# Patient Record
(demographics unavailable — no encounter records)

---

## 2025-06-27 NOTE — HISTORY OF PRESENT ILLNESS
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 Stay was complicated by hypoxic RF s/p intubation on 03/27/25 and extubation on 03/31/25. Stay was also complicated by drop in Hb on 03/31/25 with brown output from ileostomy on 03/31/25. Status post PEJ tube placement on 04/09/25. He was brought again on 05/06 for bloody ileostomy output with stay complicated by acute drop in Hb requiring transfusion. S/P EGD on 05/09/25 revealing high risk ulcers in duodenum s/p hemostasis as below. Presents today as a HFU.  Patient is doing well since he was discharged. As per family, pt was congested, so they brought him back to the ED at [facility] For possible melena, however pt was found to be stable and had dark green stools.  Pt eats minced and moist diet, taking pills by mouth, tolerating all well.  he is using PEJ tube intermittently.   Otherwise, patient denies any other GI complaints and has no abd pain, no nausea or vomiting, no dysphagia or odynophagia, no heartburn, no unintentional weight loss or appetite changes, no diarrhea, no constipation or changes in BMs including hematochezia or melena, no coffee ground emesis or hematemesis.   ROS otherwise negative. -----------------------------------------------  UGIB 2/2 Duodenal Bulb ulcer s/p recent Gold probe on 05/09 and again on 05/15; OVESCO on 05/22 Hx of IBD s/p subtotal colectomy and RLQ ileostomy Hx of gastroparesis  * CT Angio Abdomen and Pelvis w/ IV Cont (05.06.25 @ 03:39) No CTA evidence of active gastrostomy bleeding. Post subtotal colectomy with right lower quadrant ostomy.  No evidence of active inflammatory bowel disease. * Previous CT Abdomen and Pelvis w/ IV Cont (03.22.25 @ 21:21) >Post subtotal colectomy with right lower quadrant ostomy. Rectosigmoid submucosal fat deposition, likely sequela ofchronicinflammatory bowel disease. Stomach distended with debris, with abrupt caliber change at antrum/pylorus; findings could represent obstruction in the appropriate clinical setting (series 5, image 208). No small bowel obstruction; visualized bowel collapsed. Lower ventral hernia containing nonobstructed bowel. * s/p unremarkable EGD with PEG J on 04/10  * s/p EGD on 05/09: A single cratered 15 mm ulcer was found in theduodenal bulb. A visible vessel suggested recent bleeding. Puma Class 2A.4 1ml epinephrine 1/99172 injections were successfully applied for hemostasis. Gold probe was successfully applied for hemostasis. A cratered 10 mm ulcer was found in the duodenal sweep. A pigmental materialsuggested recent bleeding. Puma Class 2C.One endoclip was successfullyapplied for the purpose of hemostasis. 2 1 ml epinephrine 1/08322 injectionswere successfully appliedfor hemostasis. * s/p EGD (05.15.25 @ 10:30) A 3cm Puma Class 2A non-bleeding cratered ulcer with a visible vessel suggesting recent bleeding was seen in the duodenal bulb. A visible vessel suggested recent bleeding. Puma Class 2A. 6 1 ml epinephrine 1/21859 injections were successfully applied for hemostasis. Gold probe was successfully applied for hemostasis. . The previously placed endoclip was seen in the second part of the duodenum. No oozing of blood was seen. A small 7mm non bleeding clean-based ulcer was seen next to the previously placed endoclip. Two endoclips were successfully placed to ensure hemostasis and prevent bleeding. . * s/p EGD on 05/22: A 2 cm Puma Class 2A cratered non-bleeding ulcer with a pulsating visible vessel was seen in the duodenal bulb. 5 mLs of 1:82929 epinephrine 1/51268 weresuccessfully injected at the ulcer margins. Using a coagrasper under setting, coagulation was applied to the vessel. Active spurting resulted limiting visibility. Using aggressive irrigation and suctioning, the bleeding site was better delineated. Using a coagrasper under soft coagulationsetting, coagulation was applied and the bleeding slowed further. A 12/6 overthe scope clip (OVESCO) was successfully applied but bleeding was seen around the edge of the OVESCO. 3 mLs of 1:10,000 epinephrine was successfully injected and the bleeding slowed. Coagulation was further applied using a coagrasperunder soft coagulation setting and bleeding slowed further. A mantis clip wasdeployed and hemostatic powder (Nexpowder) applied along the site. Hemostasis was achieved and no further bleeding was seen at the end of the procedure. * s/p Colonoscopy 03/24: mild anal stenosis; mild colitis with chronic inactive proctitis on pathology  ----------------------------------------------- CT Angio Abdomen and Pelvis 5/6/25 Post liver transplantation. Gallbladder surgically resected.  1.  No CTA evidence of active gastrostomy normal bleed. 2.  Post subtotal colectomy with right lower quadrant ostomy.  No evidence of active inflammatory bowel disease.   -----------------------------------------------  Endoscopic Evaluations: Pt following with [facility] For sigmoidoscopies.   [de-identified] : 05/22/25

## 2025-06-27 NOTE — PHYSICAL EXAM
[Alert] : alert [Normal Voice/Communication] : normal voice/communication [Healthy Appearing] : healthy appearing [No Acute Distress] : no acute distress [Sclera] : the sclera and conjunctiva were normal [Hearing Threshold Finger Rub Not [place]] : hearing was normal [Normal Lips/Gums] : the lips and gums were normal [Oropharynx] : the oropharynx was normal [Normal Appearance] : the appearance of the neck was normal [No Neck Mass] : no neck mass was observed [No Respiratory Distress] : no respiratory distress [No Acc Muscle Use] : no accessory muscle use [Respiration, Rhythm And Depth] : normal respiratory rhythm and effort [Auscultation Breath Sounds / Voice Sounds] : lungs were clear to auscultation bilaterally [Heart Rate And Rhythm] : heart rate was normal and rhythm regular [Normal S1, S2] : normal S1 and S2 [Murmurs] : no murmurs [Bowel Sounds] : normal bowel sounds [Abdomen Tenderness] : non-tender [No Masses] : no abdominal mass palpated [Abdomen Soft] : soft [] : no hepatosplenomegaly [Oriented To Time, Place, And Person] : oriented to person, place, and time [de-identified] : J Tube; ileostomy noted

## 2025-06-27 NOTE — ASSESSMENT
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 Stay was complicated by hypoxic RF s/p intubation on 03/27/25 and extubation on 03/31/25. Stay was also complicated by drop in Hb on 03/31/25 with brown output from ileostomy on 03/31/25. Status post PEJ tube placement on 04/09/25. He was brought again on 05/06 for bloody ileostomy output with stay complicated by acute drop in Hb requiring transfusion. S/P EGD on 05/09/25 revealing high risk ulcers in duodenum s/p hemostasis as below. Presents today as a HFU.  #Ulcerative colitis; gastrocolnic fistula; s/p subtotal colectomy.  #GI Bleed - bleeding duodenal ulcers s/p OVESCO + thermal therapy + hemostatic spray #malnutrition s/p PEJ Tube #hx of LT on  -Continue PPI -Patient is stable, clinically doing well  -No melena noted -Discussed removal of PEJ tube when pts weight is stable and caloric intake is stable. pt will call us when cleared to have it removed -No need for any endoscopic intervention -NSAID avoidance  -Continue following up with hepatology and Gastroenterology at [facility] for management of liver transplant and surveillance flex sig for hx of UC  Follow up here PRN

## 2025-06-27 NOTE — PHYSICAL EXAM

## 2025-06-27 NOTE — ASSESSMENT
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 Stay was complicated by hypoxic RF s/p intubation on 03/27/25 and extubation on 03/31/25. Stay was also complicated by drop in Hb on 03/31/25 with brown output from ileostomy on 03/31/25. Status post PEJ tube placement on 04/09/25. He was brought again on 05/06 for bloody ileostomy output with stay complicated by acute drop in Hb requiring transfusion. S/P EGD on 05/09/25 revealing high risk ulcers in duodenum s/p hemostasis as below. Presents today as a HFU.  #Ulcerative colitis; gastrocolnic fistula; s/p subtotal colectomy.  #GI Bleed - bleeding duodenal ulcers s/p OVESCO + thermal therapy + hemostatic spray #malnutrition s/p PEJ Tube #hx of LT on  -Continue PPI -Patient is stable, clinically doing well  -No melena noted -Discussed removal of PEJ tube when pts weight is stable and caloric intake is stable. pt will call us when cleared to have it removed -No need for any endoscopic intervention -NSAID avoidance  -Continue following up with hepatology and Gastroenterology at [facility] for management of liver transplant and surveillance flex sig for hx of UC  Follow up here PRN

## 2025-06-27 NOTE — HISTORY OF PRESENT ILLNESS
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 Stay was complicated by hypoxic RF s/p intubation on 03/27/25 and extubation on 03/31/25. Stay was also complicated by drop in Hb on 03/31/25 with brown output from ileostomy on 03/31/25. Status post PEJ tube placement on 04/09/25. He was brought again on 05/06 for bloody ileostomy output with stay complicated by acute drop in Hb requiring transfusion. S/P EGD on 05/09/25 revealing high risk ulcers in duodenum s/p hemostasis as below. Presents today as a HFU.  Patient is doing well since he was discharged. As per family, pt was congested, so they brought him back to the ED at [facility] For possible melena, however pt was found to be stable and had dark green stools.  Pt eats minced and moist diet, taking pills by mouth, tolerating all well.  he is using PEJ tube intermittently.   Otherwise, patient denies any other GI complaints and has no abd pain, no nausea or vomiting, no dysphagia or odynophagia, no heartburn, no unintentional weight loss or appetite changes, no diarrhea, no constipation or changes in BMs including hematochezia or melena, no coffee ground emesis or hematemesis.   ROS otherwise negative. -----------------------------------------------  UGIB 2/2 Duodenal Bulb ulcer s/p recent Gold probe on 05/09 and again on 05/15; OVESCO on 05/22 Hx of IBD s/p subtotal colectomy and RLQ ileostomy Hx of gastroparesis  * CT Angio Abdomen and Pelvis w/ IV Cont (05.06.25 @ 03:39) No CTA evidence of active gastrostomy bleeding. Post subtotal colectomy with right lower quadrant ostomy.  No evidence of active inflammatory bowel disease. * Previous CT Abdomen and Pelvis w/ IV Cont (03.22.25 @ 21:21) >Post subtotal colectomy with right lower quadrant ostomy. Rectosigmoid submucosal fat deposition, likely sequela ofchronicinflammatory bowel disease. Stomach distended with debris, with abrupt caliber change at antrum/pylorus; findings could represent obstruction in the appropriate clinical setting (series 5, image 208). No small bowel obstruction; visualized bowel collapsed. Lower ventral hernia containing nonobstructed bowel. * s/p unremarkable EGD with PEG J on 04/10  * s/p EGD on 05/09: A single cratered 15 mm ulcer was found in theduodenal bulb. A visible vessel suggested recent bleeding. Puma Class 2A.4 1ml epinephrine 1/72403 injections were successfully applied for hemostasis. Gold probe was successfully applied for hemostasis. A cratered 10 mm ulcer was found in the duodenal sweep. A pigmental materialsuggested recent bleeding. Puma Class 2C.One endoclip was successfullyapplied for the purpose of hemostasis. 2 1 ml epinephrine 1/38222 injectionswere successfully appliedfor hemostasis. * s/p EGD (05.15.25 @ 10:30) A 3cm Puma Class 2A non-bleeding cratered ulcer with a visible vessel suggesting recent bleeding was seen in the duodenal bulb. A visible vessel suggested recent bleeding. Puma Class 2A. 6 1 ml epinephrine 1/87573 injections were successfully applied for hemostasis. Gold probe was successfully applied for hemostasis. . The previously placed endoclip was seen in the second part of the duodenum. No oozing of blood was seen. A small 7mm non bleeding clean-based ulcer was seen next to the previously placed endoclip. Two endoclips were successfully placed to ensure hemostasis and prevent bleeding. . * s/p EGD on 05/22: A 2 cm Puma Class 2A cratered non-bleeding ulcer with a pulsating visible vessel was seen in the duodenal bulb. 5 mLs of 1:55529 epinephrine 1/96969 weresuccessfully injected at the ulcer margins. Using a coagrasper under setting, coagulation was applied to the vessel. Active spurting resulted limiting visibility. Using aggressive irrigation and suctioning, the bleeding site was better delineated. Using a coagrasper under soft coagulationsetting, coagulation was applied and the bleeding slowed further. A 12/6 overthe scope clip (OVESCO) was successfully applied but bleeding was seen around the edge of the OVESCO. 3 mLs of 1:10,000 epinephrine was successfully injected and the bleeding slowed. Coagulation was further applied using a coagrasperunder soft coagulation setting and bleeding slowed further. A mantis clip wasdeployed and hemostatic powder (Nexpowder) applied along the site. Hemostasis was achieved and no further bleeding was seen at the end of the procedure. * s/p Colonoscopy 03/24: mild anal stenosis; mild colitis with chronic inactive proctitis on pathology  ----------------------------------------------- CT Angio Abdomen and Pelvis 5/6/25 Post liver transplantation. Gallbladder surgically resected.  1.  No CTA evidence of active gastrostomy normal bleed. 2.  Post subtotal colectomy with right lower quadrant ostomy.  No evidence of active inflammatory bowel disease.   -----------------------------------------------  Endoscopic Evaluations: Pt following with [facility] For sigmoidoscopies.   [de-identified] : 05/22/25